# Patient Record
Sex: FEMALE | Race: ASIAN | URBAN - METROPOLITAN AREA
[De-identification: names, ages, dates, MRNs, and addresses within clinical notes are randomized per-mention and may not be internally consistent; named-entity substitution may affect disease eponyms.]

---

## 2022-02-15 ENCOUNTER — OFFICE VISIT (OUTPATIENT)
Dept: URBAN - METROPOLITAN AREA CLINIC 91 | Facility: CLINIC | Age: 62
End: 2022-02-15
Payer: COMMERCIAL

## 2022-02-15 DIAGNOSIS — H25.13 AGE-RELATED NUCLEAR CATARACT, BILATERAL: ICD-10-CM

## 2022-02-15 DIAGNOSIS — H00.14 CHALAZION LEFT UPPER EYELID: Primary | ICD-10-CM

## 2022-02-15 PROCEDURE — 99202 OFFICE O/P NEW SF 15 MIN: CPT | Performed by: SPECIALIST

## 2022-02-15 ASSESSMENT — INTRAOCULAR PRESSURE
OS: 12
OD: 16

## 2022-02-15 NOTE — IMPRESSION/PLAN
Impression: Chalazion left upper eyelid: H00.14. Plan: Discussed LAMIN Chalazion and instructed patient that there is some old scar tissue. If patient would like we may proceed with Chalazion I/D but explained scar tissue may make it difficult. Patient declined at  this time. For chalazia, I have recommended warm compresses 4 times per day for 10-15 minutes at a time. . We will consider minor office procedure to drain chalazion in the near future if patient shows no improvement at next exam.